# Patient Record
Sex: FEMALE | Race: BLACK OR AFRICAN AMERICAN | Employment: OTHER | URBAN - METROPOLITAN AREA
[De-identification: names, ages, dates, MRNs, and addresses within clinical notes are randomized per-mention and may not be internally consistent; named-entity substitution may affect disease eponyms.]

---

## 2018-06-14 ENCOUNTER — HOSPITAL ENCOUNTER (OUTPATIENT)
Dept: MAMMOGRAPHY | Age: 63
Discharge: HOME | End: 2018-06-14
Attending: FAMILY MEDICINE
Payer: MEDICARE

## 2018-06-14 DIAGNOSIS — Z12.31 VISIT FOR SCREENING MAMMOGRAM: ICD-10-CM

## 2018-06-14 PROCEDURE — 77063 BREAST TOMOSYNTHESIS BI: CPT

## 2018-09-05 ENCOUNTER — HOSPITAL ENCOUNTER (EMERGENCY)
Age: 63
Discharge: HOME | End: 2018-09-05
Attending: EMERGENCY MEDICINE
Payer: MEDICARE

## 2018-09-05 VITALS
HEART RATE: 79 BPM | DIASTOLIC BLOOD PRESSURE: 84 MMHG | RESPIRATION RATE: 18 BRPM | OXYGEN SATURATION: 96 % | HEIGHT: 63 IN | WEIGHT: 181.2 LBS | TEMPERATURE: 97.4 F | SYSTOLIC BLOOD PRESSURE: 128 MMHG | BODY MASS INDEX: 32.11 KG/M2

## 2018-09-05 DIAGNOSIS — M79.662 PAIN OF LEFT CALF: Primary | ICD-10-CM

## 2018-09-05 PROCEDURE — 93971 EXTREMITY STUDY: CPT

## 2018-09-05 PROCEDURE — 99283 EMERGENCY DEPT VISIT LOW MDM: CPT

## 2018-09-05 PROCEDURE — 74011250637 HC RX REV CODE- 250/637: Performed by: PHYSICIAN ASSISTANT

## 2018-09-05 RX ORDER — ACETAMINOPHEN 500 MG
500 TABLET ORAL
Qty: 30 TAB | Refills: 0 | Status: SHIPPED | OUTPATIENT
Start: 2018-09-05

## 2018-09-05 RX ORDER — ACETAMINOPHEN 500 MG
1000 TABLET ORAL
Status: COMPLETED | OUTPATIENT
Start: 2018-09-05 | End: 2018-09-05

## 2018-09-05 RX ADMIN — ACETAMINOPHEN 1000 MG: 500 TABLET ORAL at 16:42

## 2018-09-05 NOTE — PROCEDURES
[facility]  *** FINAL REPORT ***    Name: Shayy Hall  MRN: [id number]  : 1955  HIS Order #: [id number]  [id number] [facility] Visit #: [id number]  Date: 05 Sep 2018    TYPE OF TEST: Peripheral Venous Testing    REASON FOR TEST  Pain in limb, Limb swelling    Left Leg:-  Deep venous thrombosis:           No  Superficial venous thrombosis:    No  Deep venous insufficiency:        Not examined  Superficial venous insufficiency: Not examined      INTERPRETATION/FINDINGS  PROCEDURE:  Color duplex ultrasound imaging of lower extremity veins. FINDINGS:       Right: The common femoral vein is patent and without evidence of  thrombus. Phasic flow is observed. This extremity was not otherwise  evaluated. Left:   The common femoral, deep femoral, femoral, popliteal,  posterior tibial, peroneal, and great saphenous are patent and without   evidence of thrombus;  each is fully compressible and there is no  narrowing of the flow channel on color Doppler imaging. Phasic flow  is observed in the common femoral vein. There is an incidental  finding of a prominent groin lymph node measuring 4.0cm x 0.7cm. There is an incidental finding of a popliteal fossa fluid collection  measuring 4.4cm x 1.5cm. IMPRESSION:  No evidence of left lower extremity vein thrombosis where   visualized. There is an incidental finding of a left prominent groin  lymph node as described above. There is an incidental finding of a  left popliteal fossa fluid collection as described above. ADDITIONAL COMMENTS    I have personally reviewed the data relevant to the interpretation of  this  study.     TECHNOLOGIST: Cori Tucker  Signed: 2018 03:31 PM    PHYSICIAN: Lori Herring MD  Signed: 2018 06:44 AM

## 2018-09-05 NOTE — ED PROVIDER NOTES
HPI Comments: 69 y/o female with PMHx of hypothyroidism, GERD and arthritis, presenting with complaint of left calf pain. The patient states she first noticed the pain 4 days ago while she was in [place]. She denies any falls, bending, twisting, climbing stairs, increased level of activity or other trauma. Her pain is 6/10, aching, non-radiating. She has not tried anything to relieve the pain. No recent surgeries/hospitalizations, long travel, history of malignancy, hemoptysis, oral hormone use, or previous thromboembolism. She denies redness, swelling, joint pain, weakness or numbness. The history is provided by the patient. Past Medical History:  
Diagnosis Date  Arthritis  Endocrine disease   
 thyroid  GERD (gastroesophageal reflux disease)  Thyroid disease HYPO Past Surgical History:  
Procedure Laterality Date  HX GYN    
 hysterectomy  HX HEENT    
 ORAL SURGERY, LOWER JAW BONE  
 HX HYSTERECTOMY  HX ORTHOPAEDIC  2010, 2008  
 bilateral knee replacement  (MCV) History reviewed. No pertinent family history. Social History Social History  Marital status:  Spouse name: N/A  
 Number of children: N/A  
 Years of education: N/A Occupational History  Not on file. Social History Main Topics  Smoking status: Current Every Day Smoker Packs/day: 0.25  Smokeless tobacco: Never Used Comment: SOMES 2-3 CIGARETTES/DAY.  Alcohol use No  
 Drug use: No  
 Sexual activity: Not on file Other Topics Concern  Not on file Social History Narrative ALLERGIES: Contrast dye [iodine] and Other medication Review of Systems Constitutional: Negative for chills and fever. Respiratory: Negative for shortness of breath. Cardiovascular: Negative for chest pain. Gastrointestinal: Positive for nausea. Negative for diarrhea and vomiting. Musculoskeletal: Positive for myalgias (left calf pain). Negative for arthralgias. Skin: Negative for color change, rash and wound. Neurological: Negative for weakness and numbness. All other systems reviewed and are negative. Vitals:  
 09/05/18 1424 BP: 128/84 Pulse: 79 Resp: 18 Temp: 97.4 °F (36.3 °C) SpO2: 96% Weight: 82.2 kg (181 lb 3.2 oz) Height: 5' 3\" (1.6 m) Physical Exam  
Constitutional: She is oriented to person, place, and time. She appears well-developed and well-nourished. No distress. HENT:  
Head: Normocephalic and atraumatic. Eyes: Conjunctivae and EOM are normal.  
Neck: Normal range of motion. Neck supple. Cardiovascular: Normal rate and regular rhythm. Pulses: 
     Dorsalis pedis pulses are 2+ on the right side, and 2+ on the left side. Pulmonary/Chest: Effort normal. No respiratory distress. Musculoskeletal:  
Left calf soft but tender to palpation. No appreciable swelling, erythema or increased warmth. No tenderness of popliteal fossa, anterior knee or ankle. Foot is warm, toes with intact light touch sensation. Neurological: She is alert and oriented to person, place, and time. Skin: Skin is warm and dry. She is not diaphoretic. Nursing note and vitals reviewed. MDM Number of Diagnoses or Management Options Pain of left calf:  
  
Amount and/or Complexity of Data Reviewed Tests in the radiology section of CPT®: ordered and reviewed Discuss the patient with other providers: yes (Dr. Rick Fletcher, ED attending) Patient Progress Patient progress: stable ED Course Procedures 69 y/o female with PMHx of hypothyroidism, GERD and arthritis, presenting with complaint of left calf pain. History and exam consistent with left calf muscular strain. LLE duplex ultrasound is negative for DVT.  Doubt cellulitis, compartment syndrome, Achilles tendon rupture, fracture or ligamentous injury. Safe for discharge home with Rx for tylenol and instructions for calf stretches and heating pad use. PCP follow up as needed. Strict ED return precautions discussed and provided in writing at time of discharge. The patient verbalized understanding and agreement with this plan.

## 2019-07-24 ENCOUNTER — HOSPITAL ENCOUNTER (OUTPATIENT)
Dept: MAMMOGRAPHY | Age: 64
Discharge: HOME | End: 2019-07-24
Attending: FAMILY MEDICINE
Payer: MEDICARE

## 2019-07-24 DIAGNOSIS — Z12.31 VISIT FOR SCREENING MAMMOGRAM: ICD-10-CM

## 2019-07-24 PROCEDURE — 77063 BREAST TOMOSYNTHESIS BI: CPT
